# Patient Record
Sex: FEMALE | Race: WHITE | HISPANIC OR LATINO | Employment: FULL TIME | URBAN - METROPOLITAN AREA
[De-identification: names, ages, dates, MRNs, and addresses within clinical notes are randomized per-mention and may not be internally consistent; named-entity substitution may affect disease eponyms.]

---

## 2024-08-27 ENCOUNTER — OFFICE VISIT (OUTPATIENT)
Dept: URGENT CARE | Facility: CLINIC | Age: 29
End: 2024-08-27
Payer: COMMERCIAL

## 2024-08-27 ENCOUNTER — HOSPITAL ENCOUNTER (OUTPATIENT)
Dept: RADIOLOGY | Facility: CLINIC | Age: 29
Discharge: HOME | End: 2024-08-27
Attending: NURSE PRACTITIONER
Payer: COMMERCIAL

## 2024-08-27 VITALS
WEIGHT: 122.44 LBS | OXYGEN SATURATION: 99 % | SYSTOLIC BLOOD PRESSURE: 113 MMHG | BODY MASS INDEX: 22.53 KG/M2 | RESPIRATION RATE: 18 BRPM | DIASTOLIC BLOOD PRESSURE: 57 MMHG | HEART RATE: 83 BPM | TEMPERATURE: 100 F | HEIGHT: 62 IN
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
## 2024-08-27 DIAGNOSIS — S93.601A SPRAIN OF RIGHT FOOT, INITIAL ENCOUNTER: ICD-10-CM

## 2024-08-27 PROCEDURE — 73630 X-RAY EXAM OF FOOT: CPT | Mod: LT,S$GLB,, | Performed by: RADIOLOGY

## 2024-08-27 PROCEDURE — 99213 OFFICE O/P EST LOW 20 MIN: CPT | Mod: S$GLB,,, | Performed by: NURSE PRACTITIONER

## 2024-08-27 PROCEDURE — 73630 X-RAY EXAM OF FOOT: CPT | Mod: RT,S$GLB,, | Performed by: RADIOLOGY

## 2024-08-27 RX ORDER — SERTRALINE HYDROCHLORIDE 50 MG/1
1 TABLET, FILM COATED ORAL DAILY
COMMUNITY

## 2024-08-27 RX ORDER — ASPIRIN 325 MG
1 TABLET, DELAYED RELEASE (ENTERIC COATED) ORAL
COMMUNITY

## 2024-08-27 RX ORDER — BUPROPION HYDROCHLORIDE 150 MG/1
1 TABLET ORAL DAILY
COMMUNITY

## 2024-08-27 NOTE — PROGRESS NOTES
"Subjective:      Patient ID: Kasie Mancilla is a 28 y.o. female.    Vitals:  height is 5' 2" (1.575 m) and weight is 55.6 kg (122 lb 7.5 oz). Her tympanic temperature is 99.7 °F (37.6 °C). Her blood pressure is 113/57 (abnormal) and her pulse is 83. Her respiration is 18 and oxygen saturation is 99%.     Chief Complaint: Foot Injury    Presents bilateral foot injury. Right foot hurt worse. Rates pain 8/10. Incident occurred on 08/27/24. Has taken advil. Mild relief. Right foot much worse than left    Foot Injury   The incident occurred 6 to 12 hours ago. The incident occurred at home. There was no injury mechanism. The pain is at a severity of 8/10. The pain is severe. The pain has been Constant since onset. She reports no foreign bodies present. The symptoms are aggravated by movement and weight bearing. Treatments tried: advil.       Constitution: Negative for chills and fever.   Respiratory:  Negative for cough.    Gastrointestinal:  Negative for nausea.   Musculoskeletal:  Positive for pain, trauma, joint pain and joint swelling.      Objective:     Physical Exam   Constitutional: She is oriented to person, place, and time.   HENT:   Head: Normocephalic and atraumatic.   Eyes: Conjunctivae are normal.   Cardiovascular: Normal rate.   Pulmonary/Chest: Effort normal. No respiratory distress.   Abdominal: Normal appearance.   Musculoskeletal:         General: Swelling, tenderness and signs of injury present.      Right foot: Decreased range of motion. Normal capillary refill. Tenderness, bony tenderness and swelling present.      Left foot: Normal range of motion and normal capillary refill. Tenderness and swelling present. No bony tenderness.   Neurological: She is alert and oriented to person, place, and time.   Skin: Skin is warm and dry.   Psychiatric: Her behavior is normal. Mood normal.   Nursing note and vitals reviewed.      Assessment:     1. Fall, initial encounter    2. Sprain of right foot, initial " encounter        Plan:       Fall, initial encounter  -     X-Ray Foot Complete Left; Future; Expected date: 08/27/2024  -     X-Ray Foot Complete Right; Future; Expected date: 08/27/2024    Sprain of right foot, initial encounter  -     AIR CAST WALKER BOOT FOR HOME USE        Discussed PRICE therapy:  Protect the joint with stabilizing brace or taping  Rest the extremity  Ice as many times a day for 20 minute intervals for first 48 hours or until inflammation has stabilized   Compression to provide support and help prevent swelling  Elevation above heart level to help decrease swelling  For pain, may take Ibuprofen or Naproxen  Counseled the  patient on RICE treatment and over the counter NSAIDS. Told to  avoid aggravating factors. Follow up with PCP if no improvement in a week. Follow up sooner if he develops, numbness, tingling, color change, or severe pain.